# Patient Record
(demographics unavailable — no encounter records)

---

## 2025-07-28 NOTE — REVIEW OF SYSTEMS
[Fever] : no fever [Chills] : no chills [Malaise] : no malaise [Headache] : no headache [Ear Pain] : no ear pain [Nasal Discharge] : no nasal discharge [Nasal Congestion] : no nasal congestion [Chest Pain] : no chest pain [Cough] : no cough [Congestion] : no congestion [Vomiting] : no vomiting [Diarrhea] : no diarrhea [Lightheadness] : no lightheadness [Dizziness] : no dizziness [Myalgia] : no myalgia [Rash] : no rash [Easy Bruising] : no tendency for easy bruising [Bleeding Gums] : no bleeding gums [Dysuria] : no dysuria

## 2025-07-28 NOTE — PHYSICAL EXAM
[Alert] : alert [EOMI] : grossly EOMI [Clear] : right tympanic membrane clear [Pink Nasal Mucosa] : pink nasal mucosa [Supple] : supple [FROM] : full passive range of motion [Clear to Auscultation Bilaterally] : clear to auscultation bilaterally [Regular Rate and Rhythm] : regular rate and rhythm [Normal S1, S2 audible] : normal S1, S2 audible [Soft] : soft [Acute Distress] : no acute distress [Tenderness] : no tenderness [Erythematous Oropharynx] : nonerythematous oropharynx [Murmur] : no murmur [Tender] : nontender [Distended] : nondistended [FreeTextEntry3] : L auricula brown soft nodule on the lobule about 1.5 cm in diameter [FreeTextEntry9] : small brownish nodules in the umbilicus /keloid formation after laparoscopy

## 2025-07-28 NOTE — HISTORY OF PRESENT ILLNESS
[de-identified] : pre-op clearance [FreeTextEntry6] : Jagruti came for a pre procedure evaluation. 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 It was removed once last year. There is no fever, cough, congestion or any other medical probles reported. She is not taking any medications/supplements. There is no blood test requested by a surgeon She also needs a Meningococcal B vaccine.

## 2025-07-28 NOTE — DISCUSSION/SUMMARY
[] : The components of the vaccine(s) to be administered today are listed in the plan of care. The disease(s) for which the vaccine(s) are intended to prevent and the risks have been discussed with the caretaker.  The risks are also included in the appropriate vaccination information statements which have been provided to the patient's caregiver.  The caregiver has given consent to vaccinate. [FreeTextEntry1] : 19 y/o female with keloid of the L auricula. Medically cleared for the procedure. Needs meningococcal B vaccine # 1 today, VIS discussed.